# Patient Record
Sex: MALE | Race: WHITE
[De-identification: names, ages, dates, MRNs, and addresses within clinical notes are randomized per-mention and may not be internally consistent; named-entity substitution may affect disease eponyms.]

---

## 2017-06-18 ENCOUNTER — HOSPITAL ENCOUNTER (OUTPATIENT)
Dept: HOSPITAL 44 - ED | Age: 62
Setting detail: OBSERVATION
LOS: 1 days | Discharge: ANOTHER HOSPITAL | End: 2017-06-19
Attending: FAMILY MEDICINE | Admitting: FAMILY MEDICINE
Payer: COMMERCIAL

## 2017-06-18 VITALS — BODY MASS INDEX: 26 KG/M2

## 2017-06-18 DIAGNOSIS — N20.0: Primary | ICD-10-CM

## 2017-06-18 LAB
BASOPHILS NFR BLD: 0.6 % (ref 0–1.5)
EGFR (AFRICAN): > 60
EGFR (NON-AFRICAN): > 60
EOSINOPHIL NFR BLD: 2.9 % (ref 0–6.8)
MCH RBC QN AUTO: 32.1 PG (ref 28–34)
MCV RBC AUTO: 95.4 FL (ref 80–100)
MONOCYTES %: 7.7 % (ref 0–11)
NEUTROPHILS #: 5 # K/UL (ref 1.4–7.7)

## 2017-06-18 PROCEDURE — 99284 EMERGENCY DEPT VISIT MOD MDM: CPT

## 2017-06-18 PROCEDURE — 85025 COMPLETE CBC W/AUTO DIFF WBC: CPT

## 2017-06-18 PROCEDURE — 96374 THER/PROPH/DIAG INJ IV PUSH: CPT

## 2017-06-18 PROCEDURE — 36415 COLL VENOUS BLD VENIPUNCTURE: CPT

## 2017-06-18 PROCEDURE — 96375 TX/PRO/DX INJ NEW DRUG ADDON: CPT

## 2017-06-18 PROCEDURE — 80053 COMPREHEN METABOLIC PANEL: CPT

## 2017-06-18 PROCEDURE — 80048 BASIC METABOLIC PNL TOTAL CA: CPT

## 2017-06-18 PROCEDURE — 74177 CT ABD & PELVIS W/CONTRAST: CPT

## 2017-06-18 PROCEDURE — S1016 NON-PVC INTRAVENOUS ADMINIST: HCPCS

## 2017-06-18 PROCEDURE — 96361 HYDRATE IV INFUSION ADD-ON: CPT

## 2017-06-18 PROCEDURE — G0378 HOSPITAL OBSERVATION PER HR: HCPCS

## 2017-06-18 PROCEDURE — 96376 TX/PRO/DX INJ SAME DRUG ADON: CPT

## 2017-06-18 PROCEDURE — 82150 ASSAY OF AMYLASE: CPT

## 2017-06-18 PROCEDURE — 76770 US EXAM ABDO BACK WALL COMP: CPT

## 2017-06-18 PROCEDURE — 81002 URINALYSIS NONAUTO W/O SCOPE: CPT

## 2017-06-18 RX ADMIN — RETINOL, ERGOCALCIFEROL, .ALPHA.-TOCOPHEROL ACETATE, DL-, PHYTONADIONE, ASCORBIC ACID, NIACINAMIDE, RIBOFLAVIN 5-PHOSPHATE SODIUM, THIAMINE HYDROCHLORIDE, PYRIDOXINE HYDROCHLORIDE, DEXPANTHENOL, BIOTIN, FOLIC ACID, AND CYANOCOBALAMIN SCH MLS/HR: KIT at 09:39

## 2017-06-18 RX ADMIN — RETINOL, ERGOCALCIFEROL, .ALPHA.-TOCOPHEROL ACETATE, DL-, PHYTONADIONE, ASCORBIC ACID, NIACINAMIDE, RIBOFLAVIN 5-PHOSPHATE SODIUM, THIAMINE HYDROCHLORIDE, PYRIDOXINE HYDROCHLORIDE, DEXPANTHENOL, BIOTIN, FOLIC ACID, AND CYANOCOBALAMIN SCH MLS/HR: KIT at 19:07

## 2017-06-18 RX ADMIN — MORPHINE SULFATE PRN MG: 2 INJECTION, SOLUTION INTRAMUSCULAR; INTRAVENOUS at 12:37

## 2017-06-18 RX ADMIN — ONDANSETRON PRN MG: 2 INJECTION INTRAMUSCULAR; INTRAVENOUS at 15:57

## 2017-06-18 RX ADMIN — RETINOL, ERGOCALCIFEROL, .ALPHA.-TOCOPHEROL ACETATE, DL-, PHYTONADIONE, ASCORBIC ACID, NIACINAMIDE, RIBOFLAVIN 5-PHOSPHATE SODIUM, THIAMINE HYDROCHLORIDE, PYRIDOXINE HYDROCHLORIDE, DEXPANTHENOL, BIOTIN, FOLIC ACID, AND CYANOCOBALAMIN SCH MLS/HR: KIT at 20:01

## 2017-06-18 RX ADMIN — RETINOL, ERGOCALCIFEROL, .ALPHA.-TOCOPHEROL ACETATE, DL-, PHYTONADIONE, ASCORBIC ACID, NIACINAMIDE, RIBOFLAVIN 5-PHOSPHATE SODIUM, THIAMINE HYDROCHLORIDE, PYRIDOXINE HYDROCHLORIDE, DEXPANTHENOL, BIOTIN, FOLIC ACID, AND CYANOCOBALAMIN SCH: KIT at 19:30

## 2017-06-18 RX ADMIN — MORPHINE SULFATE PRN MG: 2 INJECTION, SOLUTION INTRAMUSCULAR; INTRAVENOUS at 16:11

## 2017-06-18 RX ADMIN — FINASTERIDE SCH MG: 5 TABLET, FILM COATED ORAL at 09:37

## 2017-06-18 NOTE — ED PHYSICIAN DOCUMENTATION
General Adult





- HISTORIAN


Historian: patient





- HPI


Stated Complaint: rt lower abd pain


Chief Complaint: Abdominal Pain


Onset: hours


Timing: still present


Severity: moderate


Further Comments: yes (Pt is a 61 you male with RLQ abd pain that started 

yesterday.  Pt has had n/v.  No fever.  Pt reports normal bm's and normal 

voids.  PMHx inguinal hernia repair and prostate problems.)





- ROS


CONST: no problems


EYES/ENT: none


CVS/RESP: none


GI/: abdominal pain (RLQ), vomiting, nausea


MS/SKIN/LYMPH: none





- PAST HX


Past History: other (inguinal hernia repair, prostate problems.)


Allergies/Adverse Reactions: 


 Allergies











Allergy/AdvReac Type Severity Reaction Status Date / Time


 


No Known Drug Allergies Allergy   Verified 06/18/17 06:38

















- SOCIAL HX


Smoking History: non-smoker





- FAMILY HX


Family History: No





- VITAL SIGNS


Vital Signs: 





 Vital Signs











Temp Pulse Resp BP Pulse Ox


 


 97.6 F   61   16   172/93   99 


 


 06/18/17 06:24  06/18/17 06:24  06/18/17 06:24  06/18/17 06:24  06/18/17 06:24














- REVIEWED ASSESSMENTS


Nursing Assessment  Reviewed: Yes


Vitals Reviewed: Yes





Progress





- Progress


Progress: 


CT abd/pelvis w contrast: Mild right hydronephrosis and hydroureter with 6 mm 

distal right ureteral calculus in the mid pelvis. 





Pt received Fentanyl 50 mcg IV x 2


Zofran 4 mg IV





1 L NS IVF bolus, then 100 cc/hr.





Morphine 2 mg IV


Toradol 15 mg IV





Admit to obs, Dr. Romano, for pain control.  Will continue IVF, Morphine 2 mg q4h 

prn, Zofran 4 mg q 6 h, Flomax 0.4 mg po qd.  Will not start abx until after U/

A is obtained.





ED Results Lab/Radiology





- Lab Results


Lab Results: 





 Lab Results











  06/18/17 06/18/17





  06:50 06:50


 


WBC    7.62 K/ul K/ul





    (4.00-12.00) 


 


RBC    4.77 M/ul M/ul





    (3.90-5.20) 


 


Hgb    15.3 g/dL g/dL





    (12.0-18.0) 


 


Hct    45.5 % %





    (37.0-53.0) 


 


MCV    95.4 fl fl





    (80.0-100.0) 


 


MCH    32.1 pg pg





    (28.0-34.0) 


 


MCHC    33.7 g/dL g/dL





    (30.0-36.0) 


 


RDW    12.8 % %





    (11.3-14.3) 


 


Plt Count    186 K/mm3 K/mm3





    (130-400) 


 


Neut % (Auto)    65.3 % %





    (39.0-79.0) 


 


Lymph % (Auto)    20.9 % %





    (16.0-50.0) 


 


Mono % (Auto)    7.7 % %





    (0.0-11.0) 


 


Eos % (Auto)    2.9 % %





    (0.0-6.8) 


 


Baso % (Auto)    0.6 





    (0.0-1.5) 


 


Neut #    5.0 # k/uL # k/uL





    (1.4-7.7) 


 


Lymph #    1.6 # k/uL # k/uL





    (0.6-4.0) 


 


Mono #    0.6 # k/uL # k/uL





    (0.0-0.9) 


 


Eos #    0.2 # k/uL # k/uL





    (0.0-0.6) 


 


Baso #    0.0 # k/uL # k/uL





    (0.0-0.5) 


 


Reactive Lymphs %    2.6 % %





    (0.0-5.0) 


 


Reactive Lymphs #    0.2 # k/uL # k/uL





    (0.0-0.8) 


 


Sodium  140 mmol/L mmol/L  





   (136-145)  


 


Potassium  4.0 mmol/L mmol/L  





   (3.5-5.0)  


 


Chloride  108 mmol/L mmol/L  





   ()  


 


Carbon Dioxide  26 mmol/L mmol/L  





   (20-32)  


 


BUN  17 mg/dL mg/dL  





   (10-26)  


 


Creatinine  0.9 mg/dL mg/dL  





   (0.4-1.5)  


 


Estimated Creat Clear  104   





   


 


Est GFR ( Amer)  > 60   





  (60 - ) 


 


Est GFR (Non-Af Amer)  > 60   





  (60 - ) 


 


Glucose  146 mg/dL H mg/dL  





   (70-99)  


 


Calcium  10.1 mg/dL mg/dL  





   (8.5-10.5)  


 


Total Bilirubin  1.0 mg/dL mg/dL  





   (0.2-1.2)  


 


AST  20 U/L U/L  





   (0-41)  


 


ALT  16 U/L U/L  





   (0-45)  


 


Alkaline Phosphatase  59 U/L U/L  





   ()  


 


Total Protein  7.0 g/dL g/dL  





   (6.0-8.5)  


 


Albumin  4.5 g/dL g/dL  





   (3.0-5.5)  


 


Amylase  62 U/L U/L  





   ()  














- Orders


Orders: 





 ED Orders











 Category Date Time Status


 


 CT ABD & PELVIS W/ CON Stat Exams  06/18/17 Ordered


 


 AMYLASE Routine Lab  06/18/17 06:50 Completed


 


 CBC/PLATELET/DIFF Routine Lab  06/18/17 06:50 Completed


 


 CMP Routine Lab  06/18/17 06:50 Completed


 


 URINALYSIS Routine Lab  06/18/17 Uncollected


 


 0.9 % Sodium Chloride [Normal Saline] 1,000 ml Med  06/18/17 07:00 Ordered





 IV Q10H   


 


 Ondansetron HCl/Pf [Zofran 4 mg/2 ml] Med  06/18/17 06:48 Discontinued





 4 mg IVP NOW ONE   


 


 fentaNYL CITRATE/PF [Duragesic] Med  06/18/17 06:49 Discontinued





 50 mcg IVP NOW ONE   














General Adult Physical Exam





- PHYSICAL EXAM


GENERAL APPEARANCE: moderate distress


EENT: pharynx normal


NECK: normal inspection, supple


RESPIRATORY: no resp distress, chest non-tender, breath sounds normal


CVS: reg rate & rhythm, heart sounds normal


ABDOMEN: soft, normal bowel sounds, no distension, tenderness (RLQ tenderness 

with guarding)


BACK: normal inspection, CVA tenderness (R)


SKIN: warm/dry, normal color


EXTREMITIES: non-tender, normal range of motion, no evidence of injury


NEURO: oriented X3, motor nml, sensation nml





Discharge


Clincal Impression: 


 Kidney stone on right side





Referrals: 


Matthew Romano MD [Primary Care Provider] - 


Condition: Stable


Disposition: 09 ADMITTED AS INPATIENT


Decision to Admit: [id number]


Decision Time: 08:05

## 2017-06-18 NOTE — DIAGNOSTIC IMAGING REPORT
[facility]

[address] [facility] [address]

[place], [place]. [address]

 

 

 

 

Report Submission Date: 2017 7:51:21 AM CDT

Patient       Study

Name:   AKANKSHA PICKETT       Date:   2017 7:26:32 AM CDT

MRN:          Modality Type:   CT\SR

Gender:   M       Description:   CT ABD & PELVIS W/ CON

:   53       Institution:   [facility]

Physician:   CARMELA COX

         

 

 

CT abdomen and pelvis with contrast 



Date of study: 2017. 



CLINICAL HISTORY:  PT STATES PAIN IN RIGHT UPPER QUADRANT SINCE THIS MORNING. 
NAUSEA. STATES NO SURGICAL HISTORY. (Hx) / RUQ PAIN (DICOM Hx) 





TECHNIQUE: 

1.3 mm contiguous axial images of the abdomen and pelvis with IV contrast.   
With; 90cc OMNIPAQUE    



FINDINGS: 

The lung bases are clear. 



Abdomen: A small hiatal hernia is noted. There are multiple hepatic cysts. 
Lateral left hepatic lobe cysts measure 2.2 cm x 3.5 cm. A right hepatic lobe 
cyst measures 1.3 cm. The pancreas and spleen are normal in appearance. The 
gallbladder is unremarkable. The kidneys enhance appropriately and 
symmetrically. There is mild right hydronephrosis and hydroureter. Right 
perinephric stranding is present. The aorta is normal in caliber. The small 
bowel is nondistended. There is no evidence of free air or free fluid. A fat 
containing umbilical hernia is noted. 



Pelvis: A 6 mm distal right ureteral calculus is present in the mid pelvis. 
Scattered colonic diverticulosis is evident. The bladder is normal. There is no 
evidence of free air or free fluid. The sigmoid colon and rectum are normal. 
The remaining pelvic structures are within normal limits and the bones of the 
pelvis are intact. A fat containing left inguinal hernia is noted. Slight disc 
bulging is present at L2/L3, L3/L4, L4/L5 and L5/S1. 



IMPRESSION: 

Mild right hydronephrosis and hydroureter with 6 mm distal right ureteral 
calculus in the mid pelvis. 



Hepatic cysts. 



Fat containing umbilical hernia.

 

Electronically signed on 2017 7:51:21 AM CDT by:

Juan Antonio CHASE

## 2017-06-19 VITALS
SYSTOLIC BLOOD PRESSURE: 150 MMHG | SYSTOLIC BLOOD PRESSURE: 150 MMHG | SYSTOLIC BLOOD PRESSURE: 150 MMHG | DIASTOLIC BLOOD PRESSURE: 74 MMHG | DIASTOLIC BLOOD PRESSURE: 74 MMHG | DIASTOLIC BLOOD PRESSURE: 74 MMHG | SYSTOLIC BLOOD PRESSURE: 150 MMHG | SYSTOLIC BLOOD PRESSURE: 150 MMHG | DIASTOLIC BLOOD PRESSURE: 74 MMHG | DIASTOLIC BLOOD PRESSURE: 74 MMHG

## 2017-06-19 LAB
APPEARANCE UR: CLEAR
BASOPHILS NFR BLD: 0.2 % (ref 0–1.5)
COLOR,URINE: (no result)
EGFR (AFRICAN): > 60
EGFR (NON-AFRICAN): > 60
EOSINOPHIL NFR BLD: 0.1 % (ref 0–6.8)
MCH RBC QN AUTO: 31.7 PG (ref 28–34)
MCV RBC AUTO: 96.5 FL (ref 80–100)
MONOCYTES %: 5 % (ref 0–11)
NEUTROPHILS #: 7 # K/UL (ref 1.4–7.7)
PH UR STRIP: 6.5 [PH] (ref 5–8)
RBC UR QL: (no result)
UROBILINOGEN URINE: 0.2 EU (ref 0.2–1)

## 2017-06-19 RX ADMIN — MORPHINE SULFATE PRN MG: 2 INJECTION, SOLUTION INTRAMUSCULAR; INTRAVENOUS at 11:16

## 2017-06-19 RX ADMIN — ONDANSETRON PRN MG: 2 INJECTION INTRAMUSCULAR; INTRAVENOUS at 05:00

## 2017-06-19 RX ADMIN — MORPHINE SULFATE PRN MG: 2 INJECTION, SOLUTION INTRAMUSCULAR; INTRAVENOUS at 06:06

## 2017-06-19 RX ADMIN — FINASTERIDE SCH MG: 5 TABLET, FILM COATED ORAL at 09:37

## 2017-06-19 RX ADMIN — MORPHINE SULFATE PRN MG: 2 INJECTION, SOLUTION INTRAMUSCULAR; INTRAVENOUS at 02:02

## 2017-06-19 RX ADMIN — RETINOL, ERGOCALCIFEROL, .ALPHA.-TOCOPHEROL ACETATE, DL-, PHYTONADIONE, ASCORBIC ACID, NIACINAMIDE, RIBOFLAVIN 5-PHOSPHATE SODIUM, THIAMINE HYDROCHLORIDE, PYRIDOXINE HYDROCHLORIDE, DEXPANTHENOL, BIOTIN, FOLIC ACID, AND CYANOCOBALAMIN SCH MLS/HR: KIT at 05:15

## 2017-06-19 RX ADMIN — ONDANSETRON PRN MG: 2 INJECTION INTRAMUSCULAR; INTRAVENOUS at 11:12

## 2017-06-19 RX ADMIN — MORPHINE SULFATE PRN MG: 2 INJECTION, SOLUTION INTRAMUSCULAR; INTRAVENOUS at 03:58

## 2017-06-19 NOTE — DIAGNOSTIC IMAGING REPORT
[facility] 

[facility]

[address] [address]

 

 

 

 

Report Submission Date: 2017 12:44:06 PM CDT

Patient       Study

Name:   AKANKSHA PICKETT       Date:   2017 8:13:43 AM CDT

MRN:          Modality Type:   US

Gender:   M       Description:   US RETROPERITONEAL

:   53       Institution:   [facility]

Physician:   [facility]

         

 

 

Examination: Ultrasound kidneys 



History: Right hydronephrosis 



Comparison exams: CT dated 2017 



Findings: Right kidney measures 11.3 cm in length. Left kidney measures 11. 1 
cm in length. No evidence for cortical mass bilaterally. Minimal prominence of 
the right intrarenal collecting system. No left hydronephrosis. No identifiable 
stone. Left ureteral jets visualized.  No right-sided ureteral jet. 



Impression: Minimal prominence of the right intrarenal collecting system 
without visualization of a right-sided ureteral jet. Findings suggest right 
ureteral calcification/stone though a stone is not visualized.. Follow is 
warranted.

 

Electronically signed on 2017 12:44:06 PM CDT by:

Arsh CHASE

## 2017-06-19 NOTE — ED PHYSICIAN DOCUMENTATION
Abdominal Pain





- HISTORIAN


Historian: patient





- HPI


Stated Complaint: right abd pain


Chief Complaint: Abdominal Pain


Additonal Information: 





Who approximately 24 hours prior to admission develop some right lower quadrant 

pain with radiation into the right mid abdominal area. Patient denied any 

precipitating factors that he is aware of. Patient denies any modifying factors 

associated with the pain. Initially the pain did tend to wax and wane however 

through the night patient develop more intense pain associated with nausea. 

Patient subsequently came to the ED for evaluation. Patient denies any 

abdominal surgery. Tino Mariee met with two days prior to admission and was 

normal. Patient denies any medication melena her hematemesis. Patient is not 

had a fever chills. Patient denies any urinary frequency urgency. Urinary color 

has been normal. Patient denies any previous gallbladder problems. Patient has 

a CT scan done in the ED which did show a 6 mm stone in the distal ureter. 

Patient was subsequently admitted to the hospital for further care evaluation 

and pain management.


Onset: days ago (0900 yesterday)


Context: other (no precipitating factor).  denies: out of country travel


Quality: pain, aching, cramping


Associated Symptoms: fever, nausea, vomiting.  denies: chills


Exacerbated by: movements.  denies: food


Relieved by: nothing


Further Comments: no





- ROS


CONST: no problems


GI/: denies: constipation, black stools, bloody urine, bloody stools, dark 

urine, problems urinating


CVS/RESP: none


EYES/ENT: none


MS/SKIN/LYMPH: none


NEURO/PSYCH: none





- SOCIAL HX


Smoking History: non-smoker


Alcohol Use: none


Drug Use: none





- FAMILY HX


Family History: no significant history





- PAST HX


Past History: none


Surgeries/Procedures: other (herni)


Immunizations: influenza, zostavax.  denies: pneumovax


Home Medications: 


 Ambulatory Orders











 Medication  Instructions  Recorded


 


Tamsulosin HCl [Flomax] 0.4 mg PO [id number] #14 cap.er.24h 06/19/17


 


Hydrocodone/Acetaminophen 1 each PO SEE.INSTRUCTIONS  u2 06/23/17





[Hydrocodon-Acetaminoph 2.5-325]  











Allergies/Adverse Reactions: 


 Allergies











Allergy/AdvReac Type Severity Reaction Status Date / Time


 


No Known Drug Allergies Allergy   Verified 06/18/17 06:38














- VITAL SIGNS


Vital Signs: 


 Vital Signs











Temp Pulse Resp BP Pulse Ox


 


 97.9 F   51 L  16   125/71   97 


 


 06/19/17 10:58  06/19/17 10:58  06/19/17 10:58  06/19/17 10:58  06/19/17 10:58














- REVIEWED ASSESSMENTS


Nursing Assessment  Reviewed: Yes


Vitals Reviewed: Yes





ED Results Lab/Radiology





- Lab Results


Lab Results: 


 Lab Results











  06/18/17 06/18/17





  06:50 06:50


 


WBC    7.62 K/ul K/ul





    (4.00-12.00) 


 


RBC    4.77 M/ul M/ul





    (3.90-5.20) 


 


Hgb    15.3 g/dL g/dL





    (12.0-18.0) 


 


Hct    45.5 % %





    (37.0-53.0) 


 


MCV    95.4 fl fl





    (80.0-100.0) 


 


MCH    32.1 pg pg





    (28.0-34.0) 


 


MCHC    33.7 g/dL g/dL





    (30.0-36.0) 


 


RDW    12.8 % %





    (11.3-14.3) 


 


Plt Count    186 K/mm3 K/mm3





    (130-400) 


 


Neut % (Auto)    65.3 % %





    (39.0-79.0) 


 


Lymph % (Auto)    20.9 % %





    (16.0-50.0) 


 


Mono % (Auto)    7.7 % %





    (0.0-11.0) 


 


Eos % (Auto)    2.9 % %





    (0.0-6.8) 


 


Baso % (Auto)    0.6 





    (0.0-1.5) 


 


Neut #    5.0 # k/uL # k/uL





    (1.4-7.7) 


 


Lymph #    1.6 # k/uL # k/uL





    (0.6-4.0) 


 


Mono #    0.6 # k/uL # k/uL





    (0.0-0.9) 


 


Eos #    0.2 # k/uL # k/uL





    (0.0-0.6) 


 


Baso #    0.0 # k/uL # k/uL





    (0.0-0.5) 


 


Reactive Lymphs %    2.6 % %





    (0.0-5.0) 


 


Reactive Lymphs #    0.2 # k/uL # k/uL





    (0.0-0.8) 


 


Sodium  140 mmol/L mmol/L  





   (136-145)  


 


Potassium  4.0 mmol/L mmol/L  





   (3.5-5.0)  


 


Chloride  108 mmol/L mmol/L  





   ()  


 


Carbon Dioxide  26 mmol/L mmol/L  





   (20-32)  


 


BUN  17 mg/dL mg/dL  





   (10-26)  


 


Creatinine  0.9 mg/dL mg/dL  





   (0.4-1.5)  


 


Estimated Creat Clear  104   





   


 


Est GFR ( Amer)  > 60   





  (60 - ) 


 


Est GFR (Non-Af Amer)  > 60   





  (60 - ) 


 


Glucose  146 mg/dL H mg/dL  





   (70-99)  


 


Calcium  10.1 mg/dL mg/dL  





   (8.5-10.5)  


 


Total Bilirubin  1.0 mg/dL mg/dL  





   (0.2-1.2)  


 


AST  20 U/L U/L  





   (0-41)  


 


ALT  16 U/L U/L  





   (0-45)  


 


Alkaline Phosphatase  59 U/L U/L  





   ()  


 


Total Protein  7.0 g/dL g/dL  





   (6.0-8.5)  


 


Albumin  4.5 g/dL g/dL  





   (3.0-5.5)  


 


Amylase  62 U/L U/L  





   ()  














- Orders


Orders: 


 ED Orders











 Category Date Time Status


 


 Dr. Romano NOW Care  06/18/17 08:16 Ordered


 


 Observation NOW Care  06/18/17 08:16 Ordered


 


 Regular Diet  06/18/17 Lunch Completed


 


 CT ABD & PELVIS W/ CON Stat Exams  06/18/17 Completed


 


 AMYLASE Routine Lab  06/18/17 06:50 Completed


 


 CBC/PLATELET/DIFF Routine Lab  06/18/17 06:50 Completed


 


 CMP Routine Lab  06/18/17 06:50 Completed


 


 0.9 % Sodium Chloride [Normal Saline] 1,000 ml Med  06/18/17 07:00 Discontinued





 IV Q10H   


 


 0.9 % Sodium Chloride [Normal Saline] 1,000 ml Med  06/18/17 08:30 Discontinued





 IV Q10H   


 


 0.9 % Sodium Chloride [Sodium Chloride] 50 ml Med  06/18/17 07:37 Discontinued





 IV .STK-MED   


 


 Finasteride [Proscar] Med  06/18/17 09:00 Discontinued





 5 mg PO DAILY   


 


 Ketorolac Tromethamine [Toradol] Med  06/18/17 08:20 Discontinued





 15 mg IVP NOW ONE   


 


 Ketorolac Tromethamine [Toradol] Med  06/18/17 07:56 Discontinued





 30 mg .ROUTE .STK-MED ONE   


 


 Morphine Sulfate [DepoDur] Med  06/18/17 07:51 Discontinued





 2 mg .ROUTE .STK-MED ONE   


 


 Morphine Sulfate [DepoDur] Med  06/18/17 07:50 Discontinued





 2 mg IVP NOW ONE   


 


 Morphine Sulfate [DepoDur] Med  06/18/17 08:16 Discontinued





 2 mg IVP Q4 PRN   


 


 Ondansetron HCl/Pf [Zofran 4 mg/2 ml] Med  06/18/17 06:48 Discontinued





 4 mg IVP NOW ONE   


 


 Ondansetron HCl/Pf [Zofran 4 mg/2 ml] Med  06/18/17 08:16 Discontinued





 4 mg IVP Q6H PRN   


 


 Promethazine HCl [Phenergan] Med  06/18/17 07:37 Discontinued





 25 mg .ROUTE .STK-MED ONE   


 


 Promethazine HCl [Phenergan] 25 mg Med  06/18/17 07:44 Discontinued





 0.9 % Sodium Chloride [Sodium Chloride] 50 ml   





 IV NOW   


 


 Tamsulosin HCl [Flomax] Med  06/18/17 09:00 Discontinued





 0.4 mg PO DAILY ONE   


 


 fentaNYL CITRATE/PF [Duragesic] Med  06/18/17 06:49 Discontinued





 50 mcg IVP NOW ONE   


 


 fentaNYL CITRATE/PF [Duragesic] Med  06/18/17 07:15 Discontinued





 50 mcg IVP NOW ONE   


 


 Transfer Routine Transfer  06/18/17 Completed














Abdominal Pain Physical Exam





- Physical Exam


General Appearance: alert, mild distress, moderate distress (much better then 

earlier)


EENT: ENT inspection normal, pharynx normal, no signs of dehydration


NECK: normal inspection, thyroid normal, supple.  No: lymphadenopathy, stiff 

neck


RESPIRATORY: no resp distress, chest non-tender, breath sounds normal.  No: 

wheezes, rales, rhonchi


CVS: reg rate & rhythm, heart sounds normal, equal pulses, no murmur, no gallop


ABDOMEN: soft, no organomegaly, no distension, tenderness (mild in the right 

lower and mid abd area), decreased BS.  No: mass


BACK: normal inspection, CVA tenderness (R) (mild)


SKIN: warm/dry, normal color


NEURO: oriented X3, CN's nml as tested, motor nml, sensation nml, mood/affect 

nml, cognition normal


Vital Signs: 


 Vital Signs











Temp Pulse Resp BP Pulse Ox


 


 97.9 F   51 L  16   125/71   97 


 


 06/19/17 10:58  06/19/17 10:58  06/19/17 10:58  06/19/17 10:58  06/19/17 10:58














Discharge


Clincal Impression: 


 Kidney stone on right side





Home Medications: 


Ambulatory Orders





Tamsulosin HCl [Flomax] 0.4 mg PO [id number] #14 cap.er.24h 06/19/17 


Hydrocodone/Acetaminophen [Hydrocodon-Acetaminoph 2.5-325] 1 each PO 

SEE.INSTRUCTIONS  u2 06/23/17 








Condition: Stable


Disposition: 09 ADMITTED AS INPATIENT


Decision to Admit: [id number]


Date of Decison to Admit: 06/18/17


Decision Time: 08:00

## 2017-06-19 NOTE — HISTORY AND PHYSICAL REPORT
History of Present Illnes





- History of Present Illness


Reason for Visit: abd psin


History of Present Illness: 


Who approximately 24 hours prior to admission develop some right lower quadrant 

pain with radiation into the right mid abdominal area. Patient denied any 

precipitating factors that he is aware of. Patient denies any modifying factors 

associated with the pain. Initially the pain did tend to wax and wane however 

through the night patient develop more intense pain associated with nausea. 

Patient subsequently came to the ED for evaluation. Patient denies any 

abdominal surgery. Tino Mariee met with two days prior to admission and was 

normal. Patient denies any medication melena her hematemesis. Patient is not 

had a fever chills. Patient denies any urinary frequency urgency. Urinary color 

has been normal. Patient denies any previous gallbladder problems. Patient has 

a CT scan done in the ED which did show a 6 mm stone in the distal ureter. 

Patient was subsequently admitted to the hospital for further care evaluation 

and pain management.








- Past Medical History


Cardiac: CAD





- Past Surgical History


Past Surgical History: Hernia Repair





- Past Social History


Smoke: No


Alcohol: Occassional


Drugs: None


Lives: With Family


Domestic Violence: Negative





- Health Maintenance


Health Maintenance: Cholesterol, Influenza Vaccine.  denies: Pneumococcal 

Vaccine


Influenza Vaccine: Current for this Influenza Season


Pneumonia Vaccine: No


Resuscitation Status: 


Resusciation Status





Resuscitation Status             Full Code














- Unable to Obtain History


Unable to Obtain: No





Review of Systems





- Review of Systems


Constitutional: Sweats.  negative: Fever, Chills, Weakness


Eyes: negative: pain, vision change


ENT: negative: Ear Pain, Ear Discharge, Nose Pain, Nose Discharge, Nose 

Congestion, Mouth Pain, Mouth Swelling, Throat Pain, Throat Swelling


Respiratory: negative: Cough, Dry, Shortness of Breath, Hemoptysis, SOB with 

Excertion, Pleuritic Pain, Sputum, Wheezing


Cardiovascular: negative: Chest Pain, Palpitations, Orthopnea, Paroxysmal Noc. 

Dyspnea, Edema, Light Headedness


Gastrointestinal: negative: Nausea, Vomiting, Abdominal Pain, Diarrhea, 

Constipation, Melena, Hematochezia, Deferred, Other


Genitourinary: negative: Dysuria, Frequency, Incontinence, Hematuria, Retention


Musculoskeletal: negative: Neck Pain, Shoulder Pain, Back Pain


Skin: negative: Rash, Lesions


Neurological: negative: Weakness, Numbness, Incoordination, Change in Speech





- Medications/Allergies


Allergies/Adverse Reactions: 


 Allergies











Allergy/AdvReac Type Severity Reaction Status Date / Time


 


No Known Drug Allergies Allergy   Verified 06/18/17 06:38














Current Inpatient Medications: 


 Current Inpatient Medications





Finasteride (Proscar)  5 mg PO DAILY [facility]


   Last Admin: 06/18/17 09:37 Dose:  5 mg


Sodium Chloride (Normal Saline)  1,000 mls @ 125 mls/hr IV Q10H [facility]


   Last Admin: 06/19/17 05:15 Dose:  125 mls/hr


Ketorolac Tromethamine (Toradol)  30 mg IVP NOW ONE


   Stop: 06/19/17 07:43


Morphine Sulfate (Depodur)  2 mg IVP Q2 PRN


   PRN Reason: PAIN


   Last Admin: 06/19/17 06:06 Dose:  2 mg


Morphine Sulfate (Depodur)  1 mg IVP NOW ONE


   Stop: 06/19/17 06:43


   Last Admin: 06/19/17 06:44 Dose:  1 mg


Ondansetron HCl (Zofran 4 Mg/2 Ml)  4 mg IVP Q6H PRN


   PRN Reason: Nausea / Vomiting


   Stop: 06/24/17 23:59


   Last Admin: 06/19/17 05:00 Dose:  4 mg














Exam





- Exam


Vital Signs: 


 Vital Signs (72 hours)











  06/18/17 06/18/17 06/18/17





  08:25 09:11 13:11


 


Temperature  98.2 F 98.2 F


 


Pulse Rate [ 68 54 L 54 L





Pulse ox]   


 


Respiratory 16 16 16





Rate   


 


Blood Pressure   107/66





[Left Arm]   


 


Blood Pressure 137/74 150/74 





[Right Arm]   


 


O2 Sat by Pulse 96 98 98





Oximetry   














  06/18/17 06/18/17 06/18/17





  16:58 17:00 19:29


 


Temperature  97.7 F 98.2 F


 


Pulse Rate [ 54 L 67 53 L





Pulse ox]   


 


Respiratory 16 18 20





Rate   


 


Blood Pressure  107/54 117/63





[Left Arm]   


 


Blood Pressure   





[Right Arm]   


 


O2 Sat by Pulse  97 96





Oximetry   














  06/18/17 06/19/17 06/19/17





  20:09 01:00 05:00


 


Temperature  98.2 F 98.2 F


 


Pulse Rate [  81 55 L





Pulse ox]   


 


Respiratory 20 20 20





Rate   


 


Blood Pressure  161/81 137/74





[Left Arm]   


 


Blood Pressure   





[Right Arm]   


 


O2 Sat by Pulse  95 91 L





Oximetry   














  06/19/17





  06:41


 


Temperature 


 


Pulse Rate [ 





Pulse ox] 


 


Respiratory 





Rate 


 


Blood Pressure 140/79





[Left Arm] 


 


Blood Pressure 





[Right Arm] 


 


O2 Sat by Pulse 





Oximetry 














General: Alert, Oriented to Person, Oriented to Place, Oriented to Time, 

Cooperative, Mild distress (feeling much better then earlier today)


HEENT: Atraumatic, PERRLA, EOMI, Mouth Mucous membr. moist/Pink, Nose Mucous 

membr. moist/Pink


Neck: Normal Range of Motion


Carotids: 





WNL


Thyroid: 





WNL


Lungs: Clear to auscultation, Normal air movement, Speaks full Sentences.  No: 

Wheezes, Rales, Rhonchi


Cardiovascular: Regular rate, Normal S1, Normal S2, No murmurs.  No: Gallops


Abdomen: Soft, No hepatospenomegaly, No masses, Other (mild tenderness in the 

right mid abd area, mild right CVA tenderness), Decreased Bowel Sounds.  No: 

Distended


Integumentary: Normal, Pink, Warm, Dry


Extremities: No clubbing, No cyanosis, No edema, Normal pulses, No tenderness/

swelling


Neurological: Normal gait, Normal speech, Strength Equal Bilat, Normal tone, 

Sensation intact, Cranial nerves 3-12 NL, Reflexes 2+


Psych/Mental Status: Mental status NL, Mood NL, Appropriate Affect, Intact 

Judgment





- Laboratory Results


Laboratory Results: 


 Laboratory Results











  06/18/17





  11:50


 


Urine Color  Malini


 


Urine Appearance  Clear


 


Urine pH  6.5


 


Ur Specific Gravity  1.010


 


Urine Protein  Trace


 


Urine Ketones  1+ H


 


Urine Occult Blood  3+ H


 


Urine Nitrite  Negative


 


Urine Bilirubin  Negative


 


Urine Urobilinogen  0.2


 


Ur Leukocyte Esterase  Negative


 


Urine Glucose  Negative

















Assessment/Plan





- Assessment/Plan


(1) Kidney stone on right side


Status: Acute   Current Visit: Yes   


Assessment: 


Patient has been started on Flomax. Is resting fairly comfortably at this time. 

Will continue with IV fluids. Continue with IV pain medication as needed.








VTE Assessment





- RISK FACTOR SCORE


VTE RISK FACTOR SCORES: AGE OVER 60 YEARS





- RISK


VTE LOW RISK: SCORE OF 1 OR LESS (RISK PROXIMAL DVT 0.4%)  NO PROPHYLAXIS NEEDED

## 2017-06-23 ENCOUNTER — HOSPITAL ENCOUNTER (OUTPATIENT)
Dept: HOSPITAL 44 - LAB | Age: 62
End: 2017-06-23
Attending: PHYSICIAN ASSISTANT
Payer: COMMERCIAL

## 2017-06-23 DIAGNOSIS — M79.89: Primary | ICD-10-CM

## 2017-06-23 LAB
BASOPHILS NFR BLD: 1.5 % (ref 0–1.5)
EGFR (AFRICAN): > 60
EGFR (NON-AFRICAN): > 60
EOSINOPHIL NFR BLD: 3.6 % (ref 0–6.8)
MCH RBC QN AUTO: 32.4 PG (ref 28–34)
MCV RBC AUTO: 94 FL (ref 80–100)
MONOCYTES %: 7.2 % (ref 0–11)
NEUTROPHILS #: 4 # K/UL (ref 1.4–7.7)

## 2017-06-23 PROCEDURE — 80053 COMPREHEN METABOLIC PANEL: CPT

## 2017-06-23 PROCEDURE — 85025 COMPLETE CBC W/AUTO DIFF WBC: CPT

## 2017-06-23 PROCEDURE — 36415 COLL VENOUS BLD VENIPUNCTURE: CPT

## 2017-07-03 ENCOUNTER — HOSPITAL ENCOUNTER (OUTPATIENT)
Dept: HOSPITAL 44 - LABRHC | Age: 62
End: 2017-07-03
Attending: FAMILY MEDICINE
Payer: COMMERCIAL

## 2017-07-03 DIAGNOSIS — N39.0: Primary | ICD-10-CM

## 2017-07-03 PROCEDURE — 87086 URINE CULTURE/COLONY COUNT: CPT

## 2017-09-12 NOTE — DISCHARGE SUMMARY
Discharge Summary





- Discharge Sumary


History of Present Illness: 


Who approximately 24 hours prior to admission develop some right lower quadrant 

pain with radiation into the right mid abdominal area. Patient denied any 

precipitating factors that he is aware of. Patient denies any modifying factors 

associated with the pain. Initially the pain did tend to wax and wane however 

through the night patient develop more intense pain associated with nausea. 

Patient subsequently came to the ED for evaluation. Patient denies any 

abdominal surgery. Tino Mariee met with two days prior to admission and was 

normal. Patient denies any medication melena her hematemesis. Patient is not 

had a fever chills. Patient denies any urinary frequency urgency. Urinary color 

has been normal. Patient denies any previous gallbladder problems. Patient has 

a CT scan done in the ED which did show a 6 mm stone in the distal ureter. 

Patient was subsequently admitted to the hospital for further care evaluation 

and pain management.





Condition at Discharge: Stable


Home Medications: 


 Ambulatory Orders











 Medication  Instructions  Recorded


 


Tamsulosin HCl [Flomax] 0.4 mg PO [id number] #14 cap.er.24h 06/19/17


 


Hydrocodone/Acetaminophen 1 each PO SEE.INSTRUCTIONS  u2 06/23/17





[Hydrocodon-Acetaminoph 2.5-325]  











Consultations this Visit: None


Procedures this Visit: None


Allergies/Adverse Reactions: 


 Allergies











Allergy/AdvReac Type Severity Reaction Status Date / Time


 


No Known Drug Allergies Allergy   Verified 06/18/17 06:38











Discharge Summary: 





White male who is found to have a 6 mm right distal ureter kidney stone. 

Patient was admitted to the hospital for further pain management. Patient 

continued to have waxing and waning of pain with nausea and vomiting. Urologist 

was subsequently consulted and patient was transferred for further definitive 

treatment and care of his kidney stone. Patient was transferred in stable 

condition.

## 2018-05-18 ENCOUNTER — HOSPITAL ENCOUNTER (OUTPATIENT)
Dept: HOSPITAL 44 - LAB | Age: 63
End: 2018-05-18
Attending: FAMILY MEDICINE
Payer: COMMERCIAL

## 2018-05-18 DIAGNOSIS — E78.5: Primary | ICD-10-CM

## 2018-05-18 DIAGNOSIS — G47.00: ICD-10-CM

## 2018-05-18 DIAGNOSIS — Z11.59: ICD-10-CM

## 2018-05-18 LAB
EGFR (AFRICAN): > 60
EGFR (NON-AFRICAN): > 60

## 2018-05-18 PROCEDURE — 86803 HEPATITIS C AB TEST: CPT

## 2018-05-18 PROCEDURE — 80061 LIPID PANEL: CPT

## 2018-05-18 PROCEDURE — 36415 COLL VENOUS BLD VENIPUNCTURE: CPT

## 2018-05-18 PROCEDURE — 80053 COMPREHEN METABOLIC PANEL: CPT

## 2019-12-09 ENCOUNTER — HOSPITAL ENCOUNTER (OUTPATIENT)
Dept: HOSPITAL 44 - LAB | Age: 64
End: 2019-12-09
Attending: INTERNAL MEDICINE
Payer: MEDICARE

## 2019-12-09 DIAGNOSIS — E21.3: Primary | ICD-10-CM

## 2019-12-09 DIAGNOSIS — M81.0: ICD-10-CM

## 2019-12-09 LAB
EGFR (NON-AFRICAN): > 60
MAGNESIUM: 1.9 MIU/L (ref 1.6–2.3)

## 2019-12-09 PROCEDURE — 83735 ASSAY OF MAGNESIUM: CPT

## 2019-12-09 PROCEDURE — 82340 ASSAY OF CALCIUM IN URINE: CPT

## 2019-12-09 PROCEDURE — 81050 URINALYSIS VOLUME MEASURE: CPT

## 2019-12-09 PROCEDURE — 80069 RENAL FUNCTION PANEL: CPT

## 2019-12-09 PROCEDURE — 36415 COLL VENOUS BLD VENIPUNCTURE: CPT

## 2019-12-09 PROCEDURE — 82570 ASSAY OF URINE CREATININE: CPT
